# Patient Record
Sex: MALE | Race: WHITE | NOT HISPANIC OR LATINO | Employment: FULL TIME | URBAN - METROPOLITAN AREA
[De-identification: names, ages, dates, MRNs, and addresses within clinical notes are randomized per-mention and may not be internally consistent; named-entity substitution may affect disease eponyms.]

---

## 2024-03-04 ENCOUNTER — TELEPHONE (OUTPATIENT)
Dept: GASTROENTEROLOGY | Facility: CLINIC | Age: 54
End: 2024-03-04

## 2024-03-04 NOTE — TELEPHONE ENCOUNTER
Patient came to front window and wanted to estevan apt with Dr. Lerma. Patient records are in [facility]. Last seen in 2021. Patient is having some gurggling, stomach pains on left side, and pressure under the rib cage that is causing him to have chest pains. Patient has been checked out by cardio and everything is good. Please advise.

## 2024-03-13 ENCOUNTER — OFFICE VISIT (OUTPATIENT)
Dept: GASTROENTEROLOGY | Facility: CLINIC | Age: 54
End: 2024-03-13
Payer: COMMERCIAL

## 2024-03-13 VITALS
OXYGEN SATURATION: 97 % | SYSTOLIC BLOOD PRESSURE: 128 MMHG | WEIGHT: 184.63 LBS | DIASTOLIC BLOOD PRESSURE: 84 MMHG | HEART RATE: 84 BPM | BODY MASS INDEX: 25.01 KG/M2 | HEIGHT: 72 IN

## 2024-03-13 DIAGNOSIS — K76.0 FATTY LIVER: ICD-10-CM

## 2024-03-13 DIAGNOSIS — R10.12 LUQ PAIN: ICD-10-CM

## 2024-03-13 DIAGNOSIS — Z86.010 HISTORY OF COLON POLYPS: ICD-10-CM

## 2024-03-13 DIAGNOSIS — K21.9 GASTROESOPHAGEAL REFLUX DISEASE, UNSPECIFIED WHETHER ESOPHAGITIS PRESENT: Primary | ICD-10-CM

## 2024-03-13 PROCEDURE — 1160F RVW MEDS BY RX/DR IN RCRD: CPT | Mod: CPTII,S$GLB,, | Performed by: NURSE PRACTITIONER

## 2024-03-13 PROCEDURE — 1159F MED LIST DOCD IN RCRD: CPT | Mod: CPTII,S$GLB,, | Performed by: NURSE PRACTITIONER

## 2024-03-13 PROCEDURE — 4010F ACE/ARB THERAPY RXD/TAKEN: CPT | Mod: CPTII,S$GLB,, | Performed by: NURSE PRACTITIONER

## 2024-03-13 PROCEDURE — 99204 OFFICE O/P NEW MOD 45 MIN: CPT | Mod: S$GLB,,, | Performed by: NURSE PRACTITIONER

## 2024-03-13 PROCEDURE — 3074F SYST BP LT 130 MM HG: CPT | Mod: CPTII,S$GLB,, | Performed by: NURSE PRACTITIONER

## 2024-03-13 PROCEDURE — 3008F BODY MASS INDEX DOCD: CPT | Mod: CPTII,S$GLB,, | Performed by: NURSE PRACTITIONER

## 2024-03-13 PROCEDURE — 3079F DIAST BP 80-89 MM HG: CPT | Mod: CPTII,S$GLB,, | Performed by: NURSE PRACTITIONER

## 2024-03-13 RX ORDER — LISINOPRIL AND HYDROCHLOROTHIAZIDE 20; 25 MG/1; MG/1
TABLET ORAL
COMMUNITY
Start: 2024-02-07 | End: 2024-03-13

## 2024-03-13 RX ORDER — GABAPENTIN 300 MG/1
CAPSULE ORAL
COMMUNITY
Start: 2024-02-07

## 2024-03-13 RX ORDER — LOSARTAN POTASSIUM 100 MG/1
TABLET ORAL
COMMUNITY
Start: 2024-03-05

## 2024-03-13 RX ORDER — ESCITALOPRAM OXALATE 10 MG/1
TABLET ORAL
COMMUNITY
Start: 2024-03-07

## 2024-03-13 RX ORDER — HYDROGEN PEROXIDE 3 %
20 SOLUTION, NON-ORAL MISCELLANEOUS
COMMUNITY

## 2024-03-13 RX ORDER — AMLODIPINE BESYLATE 5 MG/1
TABLET ORAL
COMMUNITY
Start: 2024-03-05

## 2024-03-13 RX ORDER — SOD SULF/POT CHLORIDE/MAG SULF 1.479 G
TABLET ORAL
Qty: 24 TABLET | Refills: 0 | Status: SHIPPED | OUTPATIENT
Start: 2024-03-13

## 2024-03-13 RX ORDER — ZOLPIDEM TARTRATE 10 MG/1
TABLET ORAL
COMMUNITY
Start: 2024-01-05

## 2024-03-13 RX ORDER — SEMAGLUTIDE 1.34 MG/ML
INJECTION, SOLUTION SUBCUTANEOUS
COMMUNITY
Start: 2024-01-05

## 2024-03-13 NOTE — PROGRESS NOTES
Clinic Note    Reason for visit:  The primary encounter diagnosis was Gastroesophageal reflux disease, unspecified whether esophagitis present. Diagnoses of LUQ pain, Fatty liver, and History of colon polyps were also pertinent to this visit.    PCP: Misael Jones       HPI:  This is a 53 y.o. male who is established. Last seen in 2021. Patient with abdominal pain.  He has been experiencing CP, LUQ pain, and syncopal episodes for the past 2 months. He went to [facility] when this initially occurred and all labs, BP, EKG was normal. Had full cardiac workup with Dr. Atkins with stress test, ECHO, Ctcoronary, table tilt, holter monitor which was normal. He would also have SOB and chest pain when this occurs. He was diagnosed with possible anxiety. He does have CT head tomorrow. He states that he has CP and LUQ that occurs daily. Describes as discomfort. Not necessarily worsens with meals. He does state his stomach seems to gurgle when the pain occurs. Takes OTC nexium for reflux which controls it well. No loss of appetite. Having BM daily. He drinks 12 pack of beer daily. Has has h/o of elevated LFTs and has been diagnosed with fatty liver    EGD/Colonoscopy 1/22/2021 GBx wnl w/o Hp, 2cm subepithelial firm lesion E@28cmBx reflux, 1 TA, 5 (of 7) hyp- repeat 3 years    2021 AIME US: fatty liver, 1.5cm focal hypoechoic adj to GB (?focal sparing)    Labs from [facility]- 3/7/2023 INR 0.86, iron-nl Ferritin 434H, RACHEL, SMAB, AMA- NEG.ceruloplasmin, A1AT- NL  Liver bx 2007 moderate micro and macrovesciluar steatosis, changes consistent with hemosiderosis    Review of Systems   Constitutional:  Negative for diaphoresis, fatigue, fever and unexpected weight change.   HENT:  Negative for hearing loss, mouth sores, postnasal drip, sore throat and trouble swallowing.    Eyes:  Negative for pain, discharge and eye dryness.   Respiratory:  Negative for apnea, cough, choking, chest tightness, shortness of breath and wheezing.     Cardiovascular:  Negative for chest pain, palpitations and leg swelling.   Gastrointestinal:  Negative for abdominal distention, abdominal pain, anal bleeding, blood in stool, change in bowel habit, constipation, diarrhea, nausea, rectal pain, vomiting, reflux and fecal incontinence.   Genitourinary:  Negative for bladder incontinence, dysuria and hematuria.   Musculoskeletal:  Negative for arthralgias, back pain and joint swelling.   Integumentary:  Negative for color change and rash.   Allergic/Immunologic: Negative for environmental allergies and food allergies.   Neurological:  Negative for seizures and headaches.   Hematological:  Negative for adenopathy. Does not bruise/bleed easily.        Past Medical History:   Diagnosis Date    Essential (primary) hypertension     Generalized anxiety disorder     Neuropathy     Type 2 diabetes mellitus with unspecified diabetic retinopathy without macular edema      History reviewed. No pertinent surgical history.  Family History   Problem Relation Age of Onset    Diabetes Mother     Hypertension Father     Colon cancer Neg Hx     Crohn's disease Neg Hx     Esophageal cancer Neg Hx     Irritable bowel syndrome Neg Hx     Liver cancer Neg Hx     Liver disease Neg Hx     Rectal cancer Neg Hx     Stomach cancer Neg Hx      Social History     Tobacco Use    Smoking status: Never    Smokeless tobacco: Current     Types: Snuff   Substance Use Topics    Alcohol use: Yes     Alcohol/week: 84.0 standard drinks of alcohol     Types: 84 Cans of beer per week     Comment: 12 pack a day    Drug use: Never     Review of patient's allergies indicates:  No Known Allergies   Medication List with Changes/Refills   New Medications    SOD SULF-POT CHLORIDE-MAG SULF (SUTAB) 1.479-0.188- 0.225 GRAM TABLET    Take according to package instructions with indicated amount of water. No breakfast day before test. May substitute with Suprep, Clenpiq, Plenvu, Moviprep or GoLytely based on Rx plan and  patient preference.   Current Medications    AMLODIPINE (NORVASC) 5 MG TABLET        ESCITALOPRAM OXALATE (LEXAPRO) 10 MG TABLET        ESOMEPRAZOLE (NEXIUM) 20 MG CAPSULE    Take 20 mg by mouth before breakfast.    GABAPENTIN (NEURONTIN) 300 MG CAPSULE        LOSARTAN (COZAAR) 100 MG TABLET        OZEMPIC 1 MG/DOSE (4 MG/3 ML)        ZOLPIDEM (AMBIEN) 10 MG TAB       Discontinued Medications    LISINOPRIL-HYDROCHLOROTHIAZIDE (PRINZIDE,ZESTORETIC) 20-25 MG TAB             Vital Signs:  /84 (BP Location: Left arm, Patient Position: Sitting)   Pulse 84   Ht 6' (1.829 m)   Wt 83.7 kg (184 lb 9.6 oz)   SpO2 97%   BMI 25.04 kg/m²        Physical Exam  Constitutional:       General: He is not in acute distress.     Appearance: Normal appearance. He is well-developed. He is not ill-appearing or toxic-appearing.   HENT:      Head: Normocephalic and atraumatic.      Nose: Nose normal.      Mouth/Throat:      Mouth: Mucous membranes are moist.      Pharynx: Oropharynx is clear. No oropharyngeal exudate or posterior oropharyngeal erythema.   Eyes:      General: Lids are normal. No scleral icterus.        Right eye: No discharge.         Left eye: No discharge.      Conjunctiva/sclera: Conjunctivae normal.   Cardiovascular:      Rate and Rhythm: Normal rate and regular rhythm.      Pulses:           Radial pulses are 2+ on the right side and 2+ on the left side.   Pulmonary:      Effort: Pulmonary effort is normal. No respiratory distress.      Breath sounds: No stridor. No wheezing.   Abdominal:      General: Bowel sounds are normal. There is no distension.      Palpations: Abdomen is soft. There is no fluid wave, hepatomegaly, splenomegaly or mass.      Tenderness: There is abdominal tenderness in the left upper quadrant. There is no guarding or rebound.   Musculoskeletal:      Cervical back: Full passive range of motion without pain.   Lymphadenopathy:      Cervical: No cervical adenopathy.   Skin:     General:  Skin is warm and dry.      Capillary Refill: Capillary refill takes less than 2 seconds.      Coloration: Skin is not cyanotic, jaundiced or pale.   Neurological:      General: No focal deficit present.      Mental Status: He is alert and oriented to person, place, and time.   Psychiatric:         Mood and Affect: Mood normal.         Behavior: Behavior is cooperative.            All of the data above and below has been reviewed by myself and any further interpretations will be reflected in the assessment and plan.   The data includes review of external notes, and independent interpretation of lab results, procedures, x-rays, and imaging reports.      Assessment:  Gastroesophageal reflux disease, unspecified whether esophagitis present  -     Ambulatory Referral to External Surgery    LUQ pain  -     Ambulatory Referral to External Surgery  -     US Abdomen Complete; Future; Expected date: 03/13/2024  -     Comprehensive Metabolic Panel; Future; Expected date: 03/13/2024  -     Amylase; Future; Expected date: 03/13/2024  -     Lipase; Future; Expected date: 03/13/2024    Fatty liver  -     US Abdomen Complete; Future; Expected date: 03/13/2024  -     Comprehensive Metabolic Panel; Future; Expected date: 03/13/2024  -     Amylase; Future; Expected date: 03/13/2024  -     Lipase; Future; Expected date: 03/13/2024  -     Ferritin; Future; Expected date: 03/13/2024  -     Iron, TIBC, and %Saturation; Future; Expected date: 03/13/2024    History of colon polyps  -     Ambulatory Referral to External Surgery  -     sod sulf-pot chloride-mag sulf (SUTAB) 1.479-0.188- 0.225 gram tablet; Take according to package instructions with indicated amount of water. No breakfast day before test. May substitute with Suprep, Clenpiq, Plenvu, Moviprep or GoLytely based on Rx plan and patient preference.  Dispense: 24 tablet; Refill: 0    LUQ pain on physical exam. Will plan for EGD w/ G/Dbx r/o h.pylori, gastritis, PUD, Celiac. Obtain  ABD US due to h/o fatty liver, elevated LFTs.  Labs for LFTs, pancreatic enzymes. 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 If iron levels are elevated will obtain HFE gene mutation.     Recommendations:    Schedule for upper and lower endoscopy with Dr. Lerma 5/10/2024.  Schedule abdominal ultrasound.  Complete labs.    Risks, benefits, and alternatives of medical management, any associated procedures, and/or treatment discussed with the patient. Patient given opportunity to ask questions and voices understanding. Patient has elected to proceed with the recommended care modalities as discussed.    Follow up in about 6 months (around 9/13/2024).    Order summary:  Orders Placed This Encounter   Procedures    US Abdomen Complete    Comprehensive Metabolic Panel    Amylase    Lipase    Ferritin    Iron, TIBC, and %Saturation    Ambulatory Referral to External Surgery        Instructed patient to notify my office if they have not been contacted within two weeks after any procedures, submitting any samples (biopsies, blood, stool, urine, etc.) or after any imaging (X-ray, CT, MRI, etc.).      Jesika Green NP    This document may have been created using a voice recognition transcribing system. Incorrect words or phrases may have been missed during proofreading. Please interpret accordingly or contact me for clarification.

## 2024-03-13 NOTE — PATIENT INSTRUCTIONS
Schedule for upper and lower endoscopy with Dr. Lerma 5/10/2024.  Schedule abdominal ultrasound.  Complete labs.    Please notify my office if you have not been contacted within two weeks after any procedures, submitting any samples (biopsies, blood, stool, urine, etc.) or after any imaging (X-ray, CT, MRI, etc.).

## 2024-03-14 LAB
%TRANSFERRIN SATURATION: 25.9 % (ref 20–50)
ALBUMIN SERPL-MCNC: 4.8 G/DL (ref 3.5–5.2)
ALBUMIN/GLOB SERPL ELPH: 1.4 {RATIO} (ref 1–2.7)
ALP ISOS SERPL LEV INH-CCNC: 80 U/L (ref 40–130)
ALT (SGPT): 34 U/L (ref 0–41)
AMYLASE SERPL-CCNC: 45 U/L (ref 28–100)
ANION GAP SERPL CALC-SCNC: 15 MMOL/L (ref 8–17)
AST SERPL-CCNC: 38 U/L (ref 0–40)
BILIRUBIN, TOTAL: 1.23 MG/DL (ref 0–1.2)
BUN/CREAT SERPL: 9.6 (ref 6–20)
CALCIUM SERPL-MCNC: 10.2 MG/DL (ref 8.6–10.2)
CARBON DIOXIDE, CO2: 28 MMOL/L (ref 22–29)
CHLORIDE: 83 MMOL/L (ref 98–107)
CREAT SERPL-MCNC: 1.6 MG/DL (ref 0.7–1.2)
FERRITIN: 686 NG/ML (ref 20–380)
GFR ESTIMATION: 51.2 ML/MIN/1.73M2
GLOBULIN: 3.5 G/DL (ref 1.5–4.5)
GLUCOSE: 116 MG/DL (ref 74–106)
IRON BINDING CAPACITY: 324 UG/DL (ref 262–472)
IRON SERPL-MCNC: 84 UG/DL (ref 59–158)
LIPASE: 47 U/L (ref 13–60)
POTASSIUM: 4.5 MMOL/L (ref 3.5–5.1)
PROT SNV-MCNC: 8.3 G/DL (ref 6.4–8.3)
SODIUM: 126 MMOL/L (ref 136–145)
UIBC SERPL-MCNC: 240 UG/DL (ref 112–346)
UREA NITROGEN (BUN): 15.3 MG/DL (ref 6–20)

## 2024-03-15 ENCOUNTER — TELEPHONE (OUTPATIENT)
Dept: GASTROENTEROLOGY | Facility: CLINIC | Age: 54
End: 2024-03-15

## 2024-03-15 DIAGNOSIS — R79.89 ELEVATED FERRITIN: Primary | ICD-10-CM

## 2024-03-15 NOTE — TELEPHONE ENCOUNTER
3/14/2024 Glucose 116H, Cr 1.6H, eGFR 51L, Na 126L, Tbili 1.23H, Ferritin 686H, iron, amylase, lipase- nl    Call with results. His bloodwork shows his sodium is very low. He needs to increase water intake and stop all alcohol. Very low sodium can worsen his dizzy spells. If he is having bad syncope or dizziness right now he needs to go to ER for IV fluids. He had a liver test and iron test that was elevated. Recommend continuing abdominal ultrasound. His iron level was also high. Will order a blood test to rule out hereditary hemochromatosis (genetic condition that causes increase storage of iron). Please send copy of labs to PCP.  Order signed for lab.  ALLYSON

## 2024-03-15 NOTE — PROGRESS NOTES
3/14/2024 Glucose 116H, Cr 1.6H, eGFR 51L, Na 126L, Tbili 1.23H, Ferritin 686H, iron, amylase, lipase- nl

## 2024-03-21 NOTE — TELEPHONE ENCOUNTER
Called patient and he stated that he has not drank for 8 days now. He has not had a dizzy spell in about 5 days. Patient stated he drinks 10 to 12 bottles of water all day long. Patient stated he does not drink cokes. He only drinks water and milk, even when they go out to eat. Patient stated he cooks everything out of a cast iron pot and adds salt to all his food no matter how much seasoning is on it. Patient stated he would like for his labs to be done at [facility] when he does his ultrasound on Tuesday if not then we wants his labs sent to [facility] in [facility]. Labs faxed to [facility]. Labs sent to pcp. 3/21/24 LRA

## 2024-04-04 LAB — DNA MUTATION ANALYSIS: NORMAL

## 2024-04-05 ENCOUNTER — TELEPHONE (OUTPATIENT)
Dept: GASTROENTEROLOGY | Facility: CLINIC | Age: 54
End: 2024-04-05
Payer: COMMERCIAL

## 2024-04-05 NOTE — TELEPHONE ENCOUNTER
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 Overall his bloodwork was negative for hereditary hemochromatosis. It did show he is carrier of one of the genes. We will recheck his iron levels at his follow up appt. Recommend to continue cessation of ETOH. Did he miss his US appt? Will need to be rescheduled.  ALLYSON

## 2024-04-08 ENCOUNTER — TELEPHONE (OUTPATIENT)
Dept: GASTROENTEROLOGY | Facility: CLINIC | Age: 54
End: 2024-04-08
Payer: COMMERCIAL

## 2024-04-08 NOTE — TELEPHONE ENCOUNTER
4/8/2024 ABD US: normal liver, GB, bile duct. Patent PV.     Call with results. His ultrasound was normal. His liver and gallbladder looked normal. Keep appointment next month for E/C with Dr. Lerma.  KN

## 2024-04-11 NOTE — TELEPHONE ENCOUNTER
Results discussed with patient per providers chart remarks. Patient voices understanding/agreement. Results sent to PCP.  -Marine CARTER CMA

## 2024-04-30 NOTE — TELEPHONE ENCOUNTER
Called and left v/m asking if pt would like to move his procedure to 5/9/24 from 5/10/24, p[er NBP request. Pending call back. shruthi

## 2024-05-01 ENCOUNTER — TELEPHONE (OUTPATIENT)
Dept: GASTROENTEROLOGY | Facility: CLINIC | Age: 54
End: 2024-05-01
Payer: COMMERCIAL

## 2024-05-01 VITALS — WEIGHT: 184 LBS | HEIGHT: 72 IN | BODY MASS INDEX: 24.92 KG/M2

## 2024-05-01 DIAGNOSIS — Z86.010 HISTORY OF COLON POLYPS: ICD-10-CM

## 2024-05-01 DIAGNOSIS — K21.9 GASTROESOPHAGEAL REFLUX DISEASE, UNSPECIFIED WHETHER ESOPHAGITIS PRESENT: Primary | ICD-10-CM

## 2024-05-01 DIAGNOSIS — R10.12 LUQ PAIN: ICD-10-CM

## 2024-05-01 NOTE — TELEPHONE ENCOUNTER
"[facility]  [address] [place] [address]  Phone: [phone]  Fax: [phone]    History & Physical         Provider: Dr. Janet Lerma    Patient Name: Rajinder REDD (age):1970  53 y.o.           Gender: male   Phone: [phone]     Referring Physician: Misael Jones     Vital Signs:   Height - 6' 0"  Weight - 184 lb  BMI -  24.95    Plan: EGD/Colonoscopy @ [facility]    Encounter Diagnoses   Name Primary?    Gastroesophageal reflux disease, unspecified whether esophagitis present Yes    LUQ pain     History of colon polyps            History:      Past Medical History:   Diagnosis Date    Essential (primary) hypertension     Generalized anxiety disorder     Neuropathy     Type 2 diabetes mellitus with unspecified diabetic retinopathy without macular edema       No past surgical history on file.   Medication List with Changes/Refills   Current Medications    AMLODIPINE (NORVASC) 5 MG TABLET        ESCITALOPRAM OXALATE (LEXAPRO) 10 MG TABLET        ESOMEPRAZOLE (NEXIUM) 20 MG CAPSULE    Take 20 mg by mouth before breakfast.    GABAPENTIN (NEURONTIN) 300 MG CAPSULE        LOSARTAN (COZAAR) 100 MG TABLET        OZEMPIC 1 MG/DOSE (4 MG/3 ML)        SOD SULF-POT CHLORIDE-MAG SULF (SUTAB) 1.479-0.188- 0.225 GRAM TABLET    Take according to package instructions with indicated amount of water. No breakfast day before test. May substitute with Suprep, Clenpiq, Plenvu, Moviprep or GoLytely based on Rx plan and patient preference.    ZOLPIDEM (AMBIEN) 10 MG TAB          Review of patient's allergies indicates:  No Known Allergies   Family History   Problem Relation Name Age of Onset    Diabetes Mother      Hypertension Father      Colon cancer Neg Hx      Crohn's disease Neg Hx      Esophageal cancer Neg Hx      Irritable bowel syndrome Neg Hx      Liver cancer Neg Hx      Liver disease Neg Hx      " Rectal cancer Neg Hx      Stomach cancer Neg Hx        Social History     Tobacco Use    Smoking status: Never    Smokeless tobacco: Current     Types: Snuff   Substance Use Topics    Alcohol use: Yes     Alcohol/week: 84.0 standard drinks of alcohol     Types: 84 Cans of beer per week     Comment: 12 pack a day    Drug use: Never        Physical Examination:     General Appearance:___________________________  HEENT: _____________________________________  Abdomen:____________________________________  Heart:________________________________________  Lungs:_______________________________________  Extremities:___________________________________  Skin:_________________________________________  Endocrine:____________________________________  Genitourinary:_________________________________  Neurological:__________________________________      Patient has been evaluated immediately prior to sedation and is medically cleared for endoscopy with IVCS as an ASA class: ______      Physician Signature: _________________________       Date: ________  Time: ________

## 2024-05-01 NOTE — TELEPHONE ENCOUNTER
Left detailed message that I was calling as a courtesy regarding his upcoming EGD/Colon with NBP on 5/10/24, Friday and wanted to verify that he still has his prep instructions and meds. I reminded pt not to take Ozempic 7 days before the procedure.  I also mentioned that [facility] will call her the day before (THURS) with the arrival time, GI Lab is located on the third floor, and to pre-register this week. shruthi

## 2024-05-07 ENCOUNTER — TELEPHONE (OUTPATIENT)
Dept: GASTROENTEROLOGY | Facility: CLINIC | Age: 54
End: 2024-05-07
Payer: COMMERCIAL

## 2024-05-07 NOTE — TELEPHONE ENCOUNTER
----- Message from Estee Carl sent at 5/6/2024  3:14 PM CDT -----  Regarding: Reschedule  Contact: patient  Per phone call with patient, he stated that he would like to cancel the appointment for the colonoscopy and Upper and Lower GI to be reschedule.  Please return call at [phone] (home.    Thanks,  SJ

## 2024-05-07 NOTE — TELEPHONE ENCOUNTER
Returned pt call and left v/m that I rec'd his message to cancel his EGD/Colon on 5/10/24, to please call back and I would be happy to r/s him. shruthi

## 2025-02-05 ENCOUNTER — TELEPHONE (OUTPATIENT)
Dept: GASTROENTEROLOGY | Facility: CLINIC | Age: 55
End: 2025-02-05
Payer: COMMERCIAL

## 2025-02-05 VITALS — HEIGHT: 72 IN | WEIGHT: 184 LBS | BODY MASS INDEX: 24.92 KG/M2

## 2025-02-05 DIAGNOSIS — K21.9 GASTROESOPHAGEAL REFLUX DISEASE, UNSPECIFIED WHETHER ESOPHAGITIS PRESENT: Primary | ICD-10-CM

## 2025-02-05 DIAGNOSIS — R10.12 LUQ PAIN: ICD-10-CM

## 2025-02-05 DIAGNOSIS — Z86.0100 HISTORY OF COLON POLYPS: ICD-10-CM

## 2025-02-05 NOTE — TELEPHONE ENCOUNTER
S/w pt and told him that I was calling as a courtesy regarding up coming EGD/Colon with NBP on 2/13/25, Thurs and wanted to verify that he has his paper prep instructions and meds. Pt stated he has both. I reminded pt not to take Ozempic 7 days before the procedure. Pt acknowledge he understood. I also mentioned that [facility] will call the day before (WED) with the arrival time, GI Lab is located on the third floor, and to pre-register before next Wed. shruthi

## 2025-02-05 NOTE — TELEPHONE ENCOUNTER
[facility]  [address] [place] [address]  Phone: [phone]  Fax: [phone]    History & Physical         Provider: Dr. Janet Lerma    Patient Name: Rajinder REDD (age):1970  54 y.o.           Gender: male   Phone: [phone]     Referring Physician: Misael Jones     Vital Signs:   Height - 6' 0'  Weight - 184 lb  BMI -  24.95    Plan: EGD/Colonoscopy @ [facility]    Encounter Diagnoses   Name Primary?    Gastroesophageal reflux disease, unspecified whether esophagitis present Yes    LUQ pain     History of colon polyps            History:      Past Medical History:   Diagnosis Date    Essential (primary) hypertension     Generalized anxiety disorder     Neuropathy     Type 2 diabetes mellitus with unspecified diabetic retinopathy without macular edema       No past surgical history on file.   Medication List with Changes/Refills   Current Medications    AMLODIPINE (NORVASC) 5 MG TABLET        ESCITALOPRAM OXALATE (LEXAPRO) 10 MG TABLET        ESOMEPRAZOLE (NEXIUM) 20 MG CAPSULE    Take 20 mg by mouth before breakfast.    GABAPENTIN (NEURONTIN) 300 MG CAPSULE        LOSARTAN (COZAAR) 100 MG TABLET        OZEMPIC 1 MG/DOSE (4 MG/3 ML)        SOD SULF-POT CHLORIDE-MAG SULF (SUTAB) 1.479-0.188- 0.225 GRAM TABLET    Take according to package instructions with indicated amount of water. No breakfast day before test. May substitute with Suprep, Clenpiq, Plenvu, Moviprep or GoLytely based on Rx plan and patient preference.    ZOLPIDEM (AMBIEN) 10 MG TAB          Review of patient's allergies indicates:  No Known Allergies   Family History   Problem Relation Name Age of Onset    Diabetes Mother      Hypertension Father      Colon cancer Neg Hx      Crohn's disease Neg Hx      Esophageal cancer Neg Hx      Irritable bowel syndrome Neg Hx      Liver cancer Neg Hx      Liver disease Neg Hx       Rectal cancer Neg Hx      Stomach cancer Neg Hx        Social History     Tobacco Use    Smoking status: Never    Smokeless tobacco: Current     Types: Snuff   Substance Use Topics    Alcohol use: Yes     Alcohol/week: 84.0 standard drinks of alcohol     Types: 84 Cans of beer per week     Comment: 12 pack a day    Drug use: Never        Physical Examination:     General Appearance:___________________________  HEENT: _____________________________________  Abdomen:____________________________________  Heart:________________________________________  Lungs:_______________________________________  Extremities:___________________________________  Skin:_________________________________________  Endocrine:____________________________________  Genitourinary:_________________________________  Neurological:__________________________________      Patient has been evaluated immediately prior to sedation and is medically cleared for endoscopy with IVCS as an ASA class: ______      Physician Signature: _________________________       Date: ________  Time: ________

## 2025-02-13 ENCOUNTER — OUTSIDE PLACE OF SERVICE (OUTPATIENT)
Dept: GASTROENTEROLOGY | Facility: CLINIC | Age: 55
End: 2025-02-13

## 2025-02-13 LAB — CRC RECOMMENDATION EXT: NORMAL

## 2025-02-23 ENCOUNTER — RESULTS FOLLOW-UP (OUTPATIENT)
Dept: GASTROENTEROLOGY | Facility: CLINIC | Age: 55
End: 2025-02-23

## 2025-02-23 DIAGNOSIS — R10.12 LEFT UPPER QUADRANT PAIN: ICD-10-CM

## 2025-02-23 DIAGNOSIS — Z86.0100 HISTORY OF COLON POLYPS: Primary | ICD-10-CM

## 2025-02-23 NOTE — TELEPHONE ENCOUNTER
Also, get CT coronary report from his cardiologist (should see the esophageal lesion seen on EGD).  NBP

## 2025-02-23 NOTE — TELEPHONE ENCOUNTER
DBx nl, GBx wnl, gastric polyp Bx hyp, 1 TA, 1 benign, repeat colonoscopy in one year with extended prep.    Notify patient. No signs of precancerous cells or Celiac disease on the upper endoscopy biopsies.  His colon polyps were benign. Repeat colonoscopy with extended prep in one year. The same lesion seen during his 2021 EGD was seen this time.  I rec CT abd of the panc to ensure no panc source of his pain. Order signed.  NBP

## 2025-02-24 RX ORDER — SOD SULF/POT CHLORIDE/MAG SULF 1.479 G
TABLET ORAL
Qty: 24 TABLET | Refills: 0 | Status: CANCELLED | OUTPATIENT
Start: 2025-02-24

## 2025-02-24 NOTE — TELEPHONE ENCOUNTER
Spoke with patient and gave results, remarks and recommendations per NBP. Repeat Colonoscopy with extended prep scheduled Monday 02/16/2026 @ [facility]. Patient was informed of and agreed to CT ABD. Order printed and pending Auth. Patient verbalized understanding of this information.     Patient stated that his cardiologist is Dr. Atkins with [facility]  Faxed medical records request to Dr. Atkins at [phone] requesting CT Coronary report. -DONNA,LPN

## 2025-03-04 ENCOUNTER — RESULTS FOLLOW-UP (OUTPATIENT)
Dept: GASTROENTEROLOGY | Facility: CLINIC | Age: 55
End: 2025-03-04
Payer: COMMERCIAL

## 2025-04-03 ENCOUNTER — DOCUMENTATION ONLY (OUTPATIENT)
Dept: GASTROENTEROLOGY | Facility: CLINIC | Age: 55
End: 2025-04-03
Payer: COMMERCIAL

## 2025-04-18 ENCOUNTER — TELEPHONE (OUTPATIENT)
Dept: GASTROENTEROLOGY | Facility: CLINIC | Age: 55
End: 2025-04-18
Payer: COMMERCIAL

## 2025-04-27 ENCOUNTER — TELEPHONE (OUTPATIENT)
Dept: GASTROENTEROLOGY | Facility: CLINIC | Age: 55
End: 2025-04-27
Payer: COMMERCIAL

## 2025-04-27 NOTE — TELEPHONE ENCOUNTER
----- Message from Negrita sent at 4/21/2025  7:13 AM CDT -----  Regarding: PA  I do not see CT order routed to [facility]. Was PA needed?NBP Good morning,It was approved, but they added the wrong Dr Lerma.  I will call and correct this and send order.Thanks Negrita

## 2025-04-27 NOTE — TELEPHONE ENCOUNTER
Called pt. No answer. Left message to call back or read [facility] message.   Order sent to [facility] by [facility]ARA. Auth good until 7/19/2025. Notify patient and provide CS's number for him to call and schedule.  DEBBY

## 2025-04-29 NOTE — TELEPHONE ENCOUNTER
Called patient. No answer, left VM that I was following up on CT Scan and we had approval from his insurance until 07/19/25 and if he has not heard from the hospital about scheduling to call the [facility] dept (left the number in message.) Encouraged patient to call clinic back with any questions. -DONNA,LPN